# Patient Record
Sex: MALE | Race: BLACK OR AFRICAN AMERICAN | Employment: FULL TIME | URBAN - METROPOLITAN AREA
[De-identification: names, ages, dates, MRNs, and addresses within clinical notes are randomized per-mention and may not be internally consistent; named-entity substitution may affect disease eponyms.]

---

## 2017-03-15 ENCOUNTER — HOSPITAL ENCOUNTER (EMERGENCY)
Facility: HOSPITAL | Age: 42
Discharge: HOME | End: 2017-03-15
Attending: EMERGENCY MEDICINE
Payer: MEDICAID

## 2017-03-15 VITALS
SYSTOLIC BLOOD PRESSURE: 101 MMHG | RESPIRATION RATE: 18 BRPM | TEMPERATURE: 98 F | DIASTOLIC BLOOD PRESSURE: 75 MMHG | HEART RATE: 57 BPM | OXYGEN SATURATION: 96 %

## 2017-03-15 DIAGNOSIS — M54.6 ACUTE BILATERAL THORACIC BACK PAIN: Primary | ICD-10-CM

## 2017-03-15 PROCEDURE — 99283 EMERGENCY DEPT VISIT LOW MDM: CPT

## 2017-03-15 RX ORDER — IBUPROFEN 600 MG/1
600 TABLET ORAL ONCE
Status: COMPLETED | OUTPATIENT
Start: 2017-03-15 | End: 2017-03-15

## 2017-03-15 RX ORDER — DIAZEPAM 2 MG/1
2 TABLET ORAL 3 TIMES DAILY PRN
Qty: 20 TABLET | Refills: 0 | Status: SHIPPED | OUTPATIENT
Start: 2017-03-15 | End: 2017-03-22

## 2017-03-15 RX ORDER — DIAZEPAM 5 MG/1
5 TABLET ORAL ONCE
Status: COMPLETED | OUTPATIENT
Start: 2017-03-15 | End: 2017-03-15

## 2017-03-15 NOTE — ED NOTES
Pt c/o mid back pain that started Saturday and went away, experience same pain last night, took tylenol and pain subsided, this morning pt experience severe pain with pain score of 7/10 and decided to come to ER to be checked, pt denies any injury to the a

## 2017-03-15 NOTE — ED PROVIDER NOTES
Patient presents with:  Back Pain (musculoskeletal)      HPI:     Chilo Doe is a 46year old male presents for a chief complaint of back pain x 5 days  Exacerbating factors include movement  Relieving factors include aspirin  Denies bowel or bladder 5 out of 5 strength, sensation intact throughout lower extremities down to the dorsal surface of the foot  Extremities:Nontender full range of motion in bilateral lower extremities, no edema, 2+ distal pulses brisk cap refill        MDM   Pulse Ox: 96%, No

## 2018-03-08 ENCOUNTER — HOSPITAL ENCOUNTER (EMERGENCY)
Facility: HOSPITAL | Age: 43
Discharge: HOME | End: 2018-03-08
Attending: EMERGENCY MEDICINE
Payer: MEDICAID

## 2018-03-08 ENCOUNTER — APPOINTMENT (OUTPATIENT)
Dept: GENERAL RADIOLOGY | Facility: HOSPITAL | Age: 43
End: 2018-03-08
Attending: EMERGENCY MEDICINE
Payer: MEDICAID

## 2018-03-08 VITALS
OXYGEN SATURATION: 99 % | HEIGHT: 73 IN | SYSTOLIC BLOOD PRESSURE: 118 MMHG | BODY MASS INDEX: 31.54 KG/M2 | DIASTOLIC BLOOD PRESSURE: 83 MMHG | HEART RATE: 66 BPM | RESPIRATION RATE: 12 BRPM | WEIGHT: 238 LBS | TEMPERATURE: 98 F

## 2018-03-08 DIAGNOSIS — M94.0 COSTOCHONDRITIS: Primary | ICD-10-CM

## 2018-03-08 PROCEDURE — 71046 X-RAY EXAM CHEST 2 VIEWS: CPT | Performed by: EMERGENCY MEDICINE

## 2018-03-08 PROCEDURE — 99283 EMERGENCY DEPT VISIT LOW MDM: CPT

## 2018-03-08 RX ORDER — NAPROXEN 500 MG/1
500 TABLET ORAL 2 TIMES DAILY PRN
Qty: 20 TABLET | Refills: 0 | Status: SHIPPED | OUTPATIENT
Start: 2018-03-08 | End: 2018-03-15

## 2018-03-08 NOTE — ED NOTES
Pt c/o noticing a lump this am in his mid chest, under the xiphoid process. Pt states that he \"can feel the lump\" with deep expiration, but denies pain. Pt denies sob/dyspnea, chest pain, abd pain, n/v/d, fevers, and recent weight loss.  Will continue to

## 2018-03-08 NOTE — ED PROVIDER NOTES
Patient Seen in: [facility] AND [facility] Emergency Department    History   Patient presents with:  Lump Mass (integumentary)    Stated Complaint: \"Found a lump under diaphram\"    HPI    Patient complains of lump under base of sternum.   No sig pain feels stra alert and oiented *3, 2-12 intact, no focal deficit noted  SKIN: good skin turgor, no  rashes  PSYCH: calm, cooperative,        ED Course   Labs Reviewed - No data to display    MDM       Cardiac Monitor: Pulse Readings from Last 1 Encounters:  03/08/18 :

## 2018-03-08 NOTE — ED INITIAL ASSESSMENT (HPI)
Found lump to epigastric area. Pt states difficulty breathing on exhalation this AMX1. Lasting about 20 mins. NO injuries or falls to the area per pt.

## 2020-07-15 RX ORDER — METRONIDAZOLE 500 MG/1
500 TABLET ORAL 2 TIMES DAILY
Qty: 14 TABLET | Refills: 0 | Status: SHIPPED | OUTPATIENT
Start: 2020-07-15 | End: 2020-07-22

## 2021-04-29 ENCOUNTER — HOSPITAL ENCOUNTER (EMERGENCY)
Facility: HOSPITAL | Age: 46
Discharge: HOME | End: 2021-04-29
Attending: EMERGENCY MEDICINE
Payer: MEDICAID

## 2021-04-29 VITALS
OXYGEN SATURATION: 100 % | RESPIRATION RATE: 18 BRPM | TEMPERATURE: 98 F | SYSTOLIC BLOOD PRESSURE: 125 MMHG | DIASTOLIC BLOOD PRESSURE: 76 MMHG | HEART RATE: 67 BPM

## 2021-04-29 DIAGNOSIS — R51.9 NONINTRACTABLE HEADACHE, UNSPECIFIED CHRONICITY PATTERN, UNSPECIFIED HEADACHE TYPE: Primary | ICD-10-CM

## 2021-04-29 PROCEDURE — 99281 EMR DPT VST MAYX REQ PHY/QHP: CPT

## 2021-04-29 NOTE — ED INITIAL ASSESSMENT (HPI)
Pt states he was sent home from work with a headache yesterday and was told he would need a note clearing him to return to work.  Denies additional symptoms

## 2021-04-29 NOTE — ED PROVIDER NOTES
Patient Seen in: [facility] Emergency Department      History   No chief complaint on file. Stated Complaint: Work Note     HPI/Subjective:   HPI    Patient presents to the emergency department requesting a return to work note.   He states that Normal rate and regular rhythm. Heart sounds: No murmur heard. Pulmonary:      Effort: Pulmonary effort is normal. No respiratory distress. Breath sounds: Normal breath sounds. Abdominal:      General: There is no distension.       [place] [address] [facility]  [id number] [place] [address] Omar Perrin [phone]    Schedule an appointment as soon as possible for a visit            Medications Prescribed:  There are no discharge medications for this patient.

## 2024-08-17 ENCOUNTER — HOSPITAL ENCOUNTER (EMERGENCY)
Facility: HOSPITAL | Age: 49
Discharge: HOME | End: 2024-08-18
Attending: EMERGENCY MEDICINE
Payer: COMMERCIAL

## 2024-08-17 ENCOUNTER — APPOINTMENT (OUTPATIENT)
Dept: CT IMAGING | Facility: HOSPITAL | Age: 49
End: 2024-08-17
Attending: EMERGENCY MEDICINE
Payer: COMMERCIAL

## 2024-08-17 DIAGNOSIS — K52.9 COLITIS: Primary | ICD-10-CM

## 2024-08-17 LAB
ALBUMIN SERPL-MCNC: 5 G/DL (ref 3.2–4.8)
ALP LIVER SERPL-CCNC: 69 U/L
ALT SERPL-CCNC: 11 U/L
ANION GAP SERPL CALC-SCNC: 7 MMOL/L (ref 0–18)
AST SERPL-CCNC: 17 U/L (ref ?–34)
BASOPHILS # BLD AUTO: 0.05 X10(3) UL (ref 0–0.2)
BASOPHILS NFR BLD AUTO: 0.5 %
BILIRUB DIRECT SERPL-MCNC: 0.5 MG/DL (ref ?–0.3)
BILIRUB SERPL-MCNC: 1.5 MG/DL (ref 0.3–1.2)
BUN BLD-MCNC: 11 MG/DL (ref 9–23)
BUN/CREAT SERPL: 8 (ref 10–20)
CALCIUM BLD-MCNC: 10 MG/DL (ref 8.7–10.4)
CHLORIDE SERPL-SCNC: 101 MMOL/L (ref 98–112)
CO2 SERPL-SCNC: 30 MMOL/L (ref 21–32)
CREAT BLD-MCNC: 1.37 MG/DL
DEPRECATED RDW RBC AUTO: 41.1 FL (ref 35.1–46.3)
EGFRCR SERPLBLD CKD-EPI 2021: 64 ML/MIN/1.73M2 (ref 60–?)
EOSINOPHIL # BLD AUTO: 0.08 X10(3) UL (ref 0–0.7)
EOSINOPHIL NFR BLD AUTO: 0.8 %
ERYTHROCYTE [DISTWIDTH] IN BLOOD BY AUTOMATED COUNT: 11.9 % (ref 11–15)
GLUCOSE BLD-MCNC: 101 MG/DL (ref 70–99)
HCT VFR BLD AUTO: 44.9 %
HGB BLD-MCNC: 16 G/DL
IMM GRANULOCYTES # BLD AUTO: 0.02 X10(3) UL (ref 0–1)
IMM GRANULOCYTES NFR BLD: 0.2 %
LIPASE SERPL-CCNC: 32 U/L (ref 12–53)
LYMPHOCYTES # BLD AUTO: 1.96 X10(3) UL (ref 1–4)
LYMPHOCYTES NFR BLD AUTO: 18.8 %
MCH RBC QN AUTO: 33.2 PG (ref 26–34)
MCHC RBC AUTO-ENTMCNC: 35.6 G/DL (ref 31–37)
MCV RBC AUTO: 93.2 FL
MONOCYTES # BLD AUTO: 0.73 X10(3) UL (ref 0.1–1)
MONOCYTES NFR BLD AUTO: 7 %
NEUTROPHILS # BLD AUTO: 7.57 X10 (3) UL (ref 1.5–7.7)
NEUTROPHILS # BLD AUTO: 7.57 X10(3) UL (ref 1.5–7.7)
NEUTROPHILS NFR BLD AUTO: 72.7 %
OSMOLALITY SERPL CALC.SUM OF ELEC: 286 MOSM/KG (ref 275–295)
PLATELET # BLD AUTO: 284 10(3)UL (ref 150–450)
POTASSIUM SERPL-SCNC: 4 MMOL/L (ref 3.5–5.1)
PROT SERPL-MCNC: 7.9 G/DL (ref 5.7–8.2)
RBC # BLD AUTO: 4.82 X10(6)UL
SODIUM SERPL-SCNC: 138 MMOL/L (ref 136–145)
WBC # BLD AUTO: 10.4 X10(3) UL (ref 4–11)

## 2024-08-17 PROCEDURE — 74177 CT ABD & PELVIS W/CONTRAST: CPT | Performed by: EMERGENCY MEDICINE

## 2024-08-17 PROCEDURE — 99284 EMERGENCY DEPT VISIT MOD MDM: CPT

## 2024-08-17 PROCEDURE — 85025 COMPLETE CBC W/AUTO DIFF WBC: CPT

## 2024-08-17 PROCEDURE — 80076 HEPATIC FUNCTION PANEL: CPT

## 2024-08-17 PROCEDURE — 80076 HEPATIC FUNCTION PANEL: CPT | Performed by: EMERGENCY MEDICINE

## 2024-08-17 PROCEDURE — 80048 BASIC METABOLIC PNL TOTAL CA: CPT

## 2024-08-17 PROCEDURE — 96374 THER/PROPH/DIAG INJ IV PUSH: CPT

## 2024-08-17 PROCEDURE — 85025 COMPLETE CBC W/AUTO DIFF WBC: CPT | Performed by: EMERGENCY MEDICINE

## 2024-08-17 PROCEDURE — 80048 BASIC METABOLIC PNL TOTAL CA: CPT | Performed by: EMERGENCY MEDICINE

## 2024-08-17 PROCEDURE — S0028 INJECTION, FAMOTIDINE, 20 MG: HCPCS | Performed by: EMERGENCY MEDICINE

## 2024-08-17 PROCEDURE — 96361 HYDRATE IV INFUSION ADD-ON: CPT

## 2024-08-17 PROCEDURE — 83690 ASSAY OF LIPASE: CPT | Performed by: EMERGENCY MEDICINE

## 2024-08-17 PROCEDURE — 83690 ASSAY OF LIPASE: CPT

## 2024-08-17 RX ORDER — FAMOTIDINE 10 MG/ML
20 INJECTION, SOLUTION INTRAVENOUS ONCE
Status: COMPLETED | OUTPATIENT
Start: 2024-08-17 | End: 2024-08-17

## 2024-08-18 VITALS
SYSTOLIC BLOOD PRESSURE: 127 MMHG | RESPIRATION RATE: 20 BRPM | WEIGHT: 220 LBS | DIASTOLIC BLOOD PRESSURE: 76 MMHG | BODY MASS INDEX: 29.16 KG/M2 | OXYGEN SATURATION: 99 % | TEMPERATURE: 97 F | HEART RATE: 56 BPM | HEIGHT: 73 IN

## 2024-08-18 RX ORDER — ONDANSETRON 4 MG/1
4 TABLET, ORALLY DISINTEGRATING ORAL EVERY 4 HOURS PRN
Qty: 10 TABLET | Refills: 0 | Status: SHIPPED | OUTPATIENT
Start: 2024-08-18 | End: 2024-08-25

## 2024-08-18 NOTE — ED QUICK NOTES
Written and verbal discharge instructions regarding colitis were discussed with pt including rx meds, f/u w/ pcp and return to er for worsening conditions. Pt verbalized understanding

## 2024-08-18 NOTE — ED PROVIDER NOTES
Patient Seen in: [facility] Emergency Department      History     Chief Complaint   Patient presents with   • Nausea/Vomiting/Diarrhea   • Abdomen/Flank Pain     Stated Complaint: Stomach Pains, Nausea, Vomiting    Subjective:   HPI    Patient is a 49-year-old male who presents with 2 days of generalized constant abdominal pain.  Minimal relief with Pepto-Bismol.  Positive decreased p.o. intake.  Positive vomiting with no diarrhea.  Denies any fevers, sick contacts or recent travel.  Denies any bad food intake.  Denies any dizziness or weakness.  States he feels generally gassy and bloated.    Objective:   History reviewed. No pertinent past medical history.           Past Surgical History:   Procedure Laterality Date   • Augmentation cheek bone     • Closed rx nose fracture  2013                Social History     Socioeconomic History   • Marital status: Single   Tobacco Use   • Smoking status: Some Days   • Smokeless tobacco: Never   Vaping Use   • Vaping status: Never Used   Substance and Sexual Activity   • Alcohol use: Never   • Drug use: Yes     Types: Cannabis     Comment: occ     Social Determinants of Health     Food Insecurity: High Risk (5/31/2024)    Received from [facility]    Food Insecurity    • Have there been times that your food ran out, and you didn't have money to get more?: Yes   Transportation Needs: Low Risk  (5/31/2024)    Received from [facility]    Transportation Needs    • Do you have trouble getting transportation to medical appointments?: No   Housing Stability:   Recent Concern: Housing Stability - High Risk (5/31/2024)    Received from [facility]    Housing Stability    • Are you concerned about having a safe and reliable place to live?: Yes              Review of Systems    Positive for stated Chief Complaint: Nausea/Vomiting/Diarrhea and Abdomen/Flank Pain    Other systems are as noted in HPI.  Constitutional  and vital signs reviewed.      All other systems reviewed and negative except as noted above.    Physical Exam     ED Triage Vitals [08/17/24 2050]   /81   Pulse 55   Resp 20   Temp 97.2 °F (36.2 °C)   Temp src Temporal   SpO2 97 %   O2 Device None (Room air)       Current Vitals:   Vital Signs  BP: 129/73  Pulse: 56  Resp: 20  Temp: 97.2 °F (36.2 °C)  Temp src: Temporal  MAP (mmHg): 91    Oxygen Therapy  SpO2: 97 %  O2 Device: None (Room air)            Physical Exam    GENERAL: No acute distress, awake and alert  HEENT: EOMI, PERRL, MMM  Neck: supple  CV: RRR, no murmurs  Resp: CTAB, no wheezes or retractions  Ab: soft, TTP over epigastrum. No guarding or rebound, no masses. Bs normal   Extremities: FROM of all extremities  Neuro: CN intact, normal speech, normal gait, 5/5 motor strength in all extremities, no focal deficits  SKIN: warm, dry, no rashes      ED Course     Labs Reviewed   BASIC METABOLIC PANEL (8) - Abnormal; Notable for the following components:       Result Value    Glucose 101 (*)     Creatinine 1.37 (*)     BUN/CREA Ratio 8.0 (*)     All other components within normal limits   HEPATIC FUNCTION PANEL (7) - Abnormal; Notable for the following components:    Bilirubin, Total 1.5 (*)     Bilirubin, Direct 0.5 (*)     Albumin 5.0 (*)     All other components within normal limits   LIPASE - Normal   CBC WITH DIFFERENTIAL WITH PLATELET            MDM         Medical Decision Making  Ddx: gastroenteritis, GERD, pancreatitis, biliary colic    Reassessment: pt feeling improved after ivf. Notified of results  Tolerating PO  Labs reassuring    Amount and/or Complexity of Data Reviewed  External Data Reviewed: labs.     Details: Labs 6/2024 reviewed  Labs: ordered.  Radiology: ordered.     Details: CT ABDOMEN AND PELVIS WITH CONTRAST    Comparison: None      IMPRESSION:    Moderate motion artifact at the level of the pelvis, for which repeat imaging was performed.    Mild colonic wall thickening,  involving the transverse colon and descending colon as well as sigmoid and rectum.  This is suspicious for colitis.  This is likely accentuated by the decompressed state of the colon.    No evidence of drainable fluid collection or pneumoperitoneum.    Visualized portions of the appendix are unremarkable.  Upper normal appendiceal caliber, without evidence of periappendiceal inflammatory changes.    Bladder wall thickening.  This is nonspecific.  Correlate for evidence of cystitis.    No evidence of ascites or bowel obstruction.    No evidence of mass or lymphadenopathy.    Gallbladder, common bile duct, and pancreas are unremarkable.    No renal abnormality is identified.    Report sent at 12:25 AM Eastern time.    Max Mir MD      Risk  Prescription drug management.        Disposition and Plan     Clinical Impression:  1. Colitis         Disposition:  Discharge  8/18/2024 12:01 am    Follow-up:  Jefry Vail MD  [address] [place] [id number] [phone]    Follow up            Medications Prescribed:  Current Discharge Medication List        START taking these medications    Details   ondansetron 4 MG Oral Tablet Dispersible Take 1 tablet (4 mg total) by mouth every 4 (four) hours as needed for Nausea.  Qty: 10 tablet, Refills: 0

## 2024-08-18 NOTE — ED INITIAL ASSESSMENT (HPI)
Pt arrives through triage with complaints of nausea/ vomiting that started yesterday. Mild abd pain, states it feels like \"gas\". Denies fevers

## 2024-08-18 NOTE — ED QUICK NOTES
This RN performed a PO challenge. Patient was able to tolerate fluids. No emesis or nausea noted.

## 2024-08-19 LAB
ALBUMIN SERPL-MCNC: 5 G/DL (ref 3.2–4.8)
ALBUMIN/GLOB SERPL: 1.7 {RATIO} (ref 1–2)
ALP LIVER SERPL-CCNC: 67 U/L
ALT SERPL-CCNC: 23 U/L
ANION GAP SERPL CALC-SCNC: 8 MMOL/L (ref 0–18)
AST SERPL-CCNC: 65 U/L (ref ?–34)
BASOPHILS # BLD AUTO: 0.06 X10(3) UL (ref 0–0.2)
BASOPHILS NFR BLD AUTO: 0.5 %
BILIRUB SERPL-MCNC: 1.7 MG/DL (ref 0.3–1.2)
BUN BLD-MCNC: 9 MG/DL (ref 9–23)
BUN/CREAT SERPL: 6.8 (ref 10–20)
CALCIUM BLD-MCNC: 10.2 MG/DL (ref 8.7–10.4)
CHLORIDE SERPL-SCNC: 101 MMOL/L (ref 98–112)
CO2 SERPL-SCNC: 28 MMOL/L (ref 21–32)
CREAT BLD-MCNC: 1.33 MG/DL
DEPRECATED RDW RBC AUTO: 40.8 FL (ref 35.1–46.3)
EGFRCR SERPLBLD CKD-EPI 2021: 66 ML/MIN/1.73M2 (ref 60–?)
EOSINOPHIL # BLD AUTO: 0.1 X10(3) UL (ref 0–0.7)
EOSINOPHIL NFR BLD AUTO: 0.9 %
ERYTHROCYTE [DISTWIDTH] IN BLOOD BY AUTOMATED COUNT: 12.2 % (ref 11–15)
GLOBULIN PLAS-MCNC: 3 G/DL (ref 2–3.5)
GLUCOSE BLD-MCNC: 90 MG/DL (ref 70–99)
HCT VFR BLD AUTO: 44.2 %
HGB BLD-MCNC: 16 G/DL
IMM GRANULOCYTES # BLD AUTO: 0.02 X10(3) UL (ref 0–1)
IMM GRANULOCYTES NFR BLD: 0.2 %
LYMPHOCYTES # BLD AUTO: 2.33 X10(3) UL (ref 1–4)
LYMPHOCYTES NFR BLD AUTO: 21 %
MCH RBC QN AUTO: 33.2 PG (ref 26–34)
MCHC RBC AUTO-ENTMCNC: 36.2 G/DL (ref 31–37)
MCV RBC AUTO: 91.7 FL
MONOCYTES # BLD AUTO: 1.12 X10(3) UL (ref 0.1–1)
MONOCYTES NFR BLD AUTO: 10.1 %
NEUTROPHILS # BLD AUTO: 7.47 X10 (3) UL (ref 1.5–7.7)
NEUTROPHILS # BLD AUTO: 7.47 X10(3) UL (ref 1.5–7.7)
NEUTROPHILS NFR BLD AUTO: 67.3 %
OSMOLALITY SERPL CALC.SUM OF ELEC: 282 MOSM/KG (ref 275–295)
PLATELET # BLD AUTO: 295 10(3)UL (ref 150–450)
POTASSIUM SERPL-SCNC: 4.1 MMOL/L (ref 3.5–5.1)
PROT SERPL-MCNC: 8 G/DL (ref 5.7–8.2)
RBC # BLD AUTO: 4.82 X10(6)UL
SODIUM SERPL-SCNC: 137 MMOL/L (ref 136–145)
WBC # BLD AUTO: 11.1 X10(3) UL (ref 4–11)

## 2024-08-19 PROCEDURE — 85025 COMPLETE CBC W/AUTO DIFF WBC: CPT | Performed by: EMERGENCY MEDICINE

## 2024-08-19 PROCEDURE — 96375 TX/PRO/DX INJ NEW DRUG ADDON: CPT

## 2024-08-19 PROCEDURE — 85025 COMPLETE CBC W/AUTO DIFF WBC: CPT

## 2024-08-19 PROCEDURE — 80053 COMPREHEN METABOLIC PANEL: CPT | Performed by: EMERGENCY MEDICINE

## 2024-08-19 PROCEDURE — 80053 COMPREHEN METABOLIC PANEL: CPT

## 2024-08-19 PROCEDURE — 96361 HYDRATE IV INFUSION ADD-ON: CPT

## 2024-08-19 PROCEDURE — 99284 EMERGENCY DEPT VISIT MOD MDM: CPT

## 2024-08-19 PROCEDURE — 96374 THER/PROPH/DIAG INJ IV PUSH: CPT

## 2024-08-20 ENCOUNTER — HOSPITAL ENCOUNTER (EMERGENCY)
Facility: HOSPITAL | Age: 49
Discharge: HOME | End: 2024-08-20
Attending: EMERGENCY MEDICINE
Payer: COMMERCIAL

## 2024-08-20 VITALS
TEMPERATURE: 97 F | DIASTOLIC BLOOD PRESSURE: 67 MMHG | RESPIRATION RATE: 14 BRPM | HEART RATE: 55 BPM | WEIGHT: 225 LBS | SYSTOLIC BLOOD PRESSURE: 117 MMHG | OXYGEN SATURATION: 100 % | BODY MASS INDEX: 29.82 KG/M2 | HEIGHT: 73 IN

## 2024-08-20 DIAGNOSIS — R11.2 NAUSEA AND VOMITING, UNSPECIFIED VOMITING TYPE: ICD-10-CM

## 2024-08-20 DIAGNOSIS — K52.9 COLITIS: Primary | ICD-10-CM

## 2024-08-20 LAB — SARS-COV-2 RNA RESP QL NAA+PROBE: NOT DETECTED

## 2024-08-20 PROCEDURE — S0028 INJECTION, FAMOTIDINE, 20 MG: HCPCS | Performed by: EMERGENCY MEDICINE

## 2024-08-20 RX ORDER — CIPROFLOXACIN 500 MG/1
500 TABLET, FILM COATED ORAL 2 TIMES DAILY
Qty: 20 TABLET | Refills: 0 | Status: SHIPPED | OUTPATIENT
Start: 2024-08-20 | End: 2024-08-30

## 2024-08-20 RX ORDER — ONDANSETRON 4 MG/1
4 TABLET, ORALLY DISINTEGRATING ORAL EVERY 4 HOURS PRN
Qty: 10 TABLET | Refills: 0 | Status: SHIPPED | OUTPATIENT
Start: 2024-08-20 | End: 2024-08-27

## 2024-08-20 RX ORDER — METRONIDAZOLE 500 MG/1
500 TABLET ORAL 3 TIMES DAILY
Qty: 30 TABLET | Refills: 0 | Status: SHIPPED | OUTPATIENT
Start: 2024-08-20 | End: 2024-08-30

## 2024-08-20 RX ORDER — CIPROFLOXACIN 500 MG/1
500 TABLET, FILM COATED ORAL ONCE
Status: COMPLETED | OUTPATIENT
Start: 2024-08-20 | End: 2024-08-20

## 2024-08-20 RX ORDER — FAMOTIDINE 10 MG/ML
20 INJECTION, SOLUTION INTRAVENOUS ONCE
Status: COMPLETED | OUTPATIENT
Start: 2024-08-20 | End: 2024-08-20

## 2024-08-20 RX ORDER — ONDANSETRON 2 MG/ML
4 INJECTION INTRAMUSCULAR; INTRAVENOUS ONCE
Status: COMPLETED | OUTPATIENT
Start: 2024-08-20 | End: 2024-08-20

## 2024-08-20 RX ORDER — FAMOTIDINE 20 MG/1
20 TABLET, FILM COATED ORAL 2 TIMES DAILY
Qty: 20 TABLET | Refills: 0 | Status: SHIPPED | OUTPATIENT
Start: 2024-08-20

## 2024-08-20 RX ORDER — METRONIDAZOLE 500 MG/1
500 TABLET ORAL ONCE
Status: COMPLETED | OUTPATIENT
Start: 2024-08-20 | End: 2024-08-20

## 2024-08-20 NOTE — ED QUICK NOTES
Rounding Completed    Plan of Care reviewed. Waiting for PO chall>DC.  Elimination needs assessed.  Patient resting in bed, speaking in full sentences. Pt on cardiac monitor for frequent VS assessments, NSR/SB. No new requests at this time.     Bed is locked and in lowest position. Call light within reach.

## 2024-08-20 NOTE — ED INITIAL ASSESSMENT (HPI)
Pt arrives ambulatory to ED for c/o abd pain and nausea that he was seen for a few days ago and is now getting worse today. +NV, denies diarrhea. Aox4, speaking in full sentences.

## 2024-08-20 NOTE — ED PROVIDER NOTES
Patient Seen in: [facility] Emergency Department    History     Chief Complaint   Patient presents with    Abdomen/Flank Pain       HPI    49-year-old female who presents here today with intractable vomiting is been going on for the past 2 to 3 days.  Patient was in our ER 2 days ago with symptoms for started and labs and a CT done.  CT did show colitis the patient was discharged home with Zofran as needed for nausea/vomiting.  Patient states for the past 2 days since she was discharged she has not been able to keep any food down some vomiting up everything he tries to eat or drink.  Denies any diarrhea no chest pain or shortness of breath.    History reviewed. History reviewed. No pertinent past medical history.    History reviewed.   Past Surgical History:   Procedure Laterality Date    Augmentation cheek bone      Closed rx nose fracture  2013         Medications :  (Not in a hospital admission)       No family history on file.    Smoking Status:   Social History     Socioeconomic History    Marital status:    Tobacco Use    Smoking status: Some Days    Smokeless tobacco: Never   Vaping Use    Vaping status: Never Used   Substance and Sexual Activity    Alcohol use: Never    Drug use: Yes     Types: Cannabis     Comment: occ       Constitutional and vital signs reviewed.      Social History and Family History elements reviewed from today, pertinent positives to the presenting problem noted.    Physical Exam     ED Triage Vitals [08/19/24 2100]   /80   Pulse 60   Resp 18   Temp 97.1 °F (36.2 °C)   Temp src Temporal   SpO2 99 %   O2 Device None (Room air)       All measures to prevent infection transmission during my interaction with the patient were taken. Handwashing was performed prior to and after the exam.  Stethoscope and any equipment used during my examination was cleaned with super sani-cloth germicidal wipes following the exam.     Physical Exam  Vitals and nursing note reviewed.    Cardiovascular:      Rate and Rhythm: Normal rate.   Pulmonary:      Effort: Pulmonary effort is normal.   Abdominal:      Palpations: Abdomen is soft.      Tenderness: There is no abdominal tenderness.   Skin:     General: Skin is warm and dry.      Capillary Refill: Capillary refill takes less than 2 seconds.   Neurological:      General: No focal deficit present.      Mental Status: He is alert.         ED Course        Labs Reviewed   COMP METABOLIC PANEL (14) - Abnormal; Notable for the following components:       Result Value    Creatinine 1.33 (*)     BUN/CREA Ratio 6.8 (*)     AST 65 (*)     Bilirubin, Total 1.7 (*)     Albumin 5.0 (*)     All other components within normal limits   CBC WITH DIFFERENTIAL WITH PLATELET - Abnormal; Notable for the following components:    WBC 11.1 (*)     Monocyte Absolute 1.12 (*)     All other components within normal limits   RAPID SARS-COV-2 BY PCR - Normal       As Interpreted by me    Imaging Results Available and Reviewed while in ED: No results found.  ED Medications Administered:   Medications   sodium chloride 0.9 % IV bolus 1,000 mL (0 mL Intravenous Stopped 8/20/24 0211)   ondansetron (Zofran) 4 MG/2ML injection 4 mg (4 mg Intravenous Given 8/20/24 0112)   famotidine (Pepcid) 20 mg/2mL injection 20 mg (20 mg Intravenous Given 8/20/24 0112)   ciprofloxacin (Cipro) tab 500 mg (500 mg Oral Given 8/20/24 0229)   metRONIDAZOLE (Flagyl) tab 500 mg (500 mg Oral Given 8/20/24 0229)         MDM     Vitals:    08/20/24 0215 08/20/24 0230 08/20/24 0245 08/20/24 0300   BP: 137/79 117/78 119/62 117/67   Pulse: 52 70 55 55   Resp: 18 10 13 14   Temp:       TempSrc:       SpO2: 98% 99% 98% 100%   Weight:       Height:         *I personally reviewed and interpreted all ED vitals.    Pulse Ox: 99%, Room air, Normal     Monitor Interpretation:   normal sinus rhythm as interpreted by me.  The cardiac monitor was ordered to monitor cardiac rate and rhythm.    Differential Diagnosis/  Diagnostic Considerations: Colitis, gastroenteritis, gastritis    Complicating Factors: The patient already has does not have a problem list on file. to contribute to the complexity of this ED evaluation.    Medical Decision Making  Amount and/or Complexity of Data Reviewed  External Data Reviewed: notes.     Details: Reviewed ER documentation from visit 2 days ago to compare labs from then to today along with review CT results to make sure we do not have to repeat it.  Labs: ordered. Decision-making details documented in ED Course.  Radiology:  Decision-making details documented in ED Course.  ECG/medicine tests: ordered and independent interpretation performed. Decision-making details documented in ED Course.    Risk  OTC drugs.  Prescription drug management.    Patient feels better after getting meds and tolerating p.o. fluids and antibiotics given orally.  Reviewed labs with patient and family over the bedside no acute labs nothing is changed over the past 2 days.  Since patient is feeling better explained the patient we will send home with Zofran, Pepcid, antibiotics.  Patient follow-up with primary care Vitile 1 to 2 days.  Also give GI referral the understand to call to make an appointment with the gastroenterologist.  Return to the ER if some continue, get worse, unable to follow-up    Condition upon leaving the department: Stable    Disposition and Plan     Clinical Impression:  1. Colitis    2. Nausea and vomiting, unspecified vomiting type        Disposition:  Discharge    Follow-up:  Jefry Vail MD  [address] [id number] [phone]    Follow up      Scooter Mccarthy MD  [address] [address]  [phone]    Follow up        Medications Prescribed:  Current Discharge Medication List        START taking these medications    Details   !! ondansetron 4 MG Oral Tablet Dispersible Take 1 tablet (4 mg total) by mouth every 4 (four) hours as needed for  Nausea.  Qty: 10 tablet, Refills: 0      metRONIDAZOLE 500 MG Oral Tab Take 1 tablet (500 mg total) by mouth 3 (three) times daily for 10 days.  Qty: 30 tablet, Refills: 0      ciprofloxacin 500 MG Oral Tab Take 1 tablet (500 mg total) by mouth 2 (two) times daily for 10 days.  Qty: 20 tablet, Refills: 0      famotidine 20 MG Oral Tab Take 1 tablet (20 mg total) by mouth 2 (two) times daily.  Qty: 20 tablet, Refills: 0       !! - Potential duplicate medications found. Please discuss with provider.

## 2024-08-20 NOTE — ED QUICK NOTES
Pt provided water and oral meds for PO challenge. Pt tolerated well with no repeat episodes of NV. Provider notified.

## 2024-08-20 NOTE — DISCHARGE INSTRUCTIONS
Follow-up with your primary care provider 1 to 2 days.  Given gastroenterology referral call to make a follow-up appointment.  Return to the ER if some continue, get worse, unable to follow-up

## (undated) NOTE — LETTER
March 15, 2017    Patient: William Alarcon   Date of Visit: 3/15/2017       To Whom It May Concern:    William Alarcon was seen and treated in our emergency department on 3/15/2017. He can return to work on 3/16/17.     If you have any questions or conc

## (undated) NOTE — ED AVS SNAPSHOT
[facility] Emergency Department    [address]     [place] [address]    Phone:  [phone]    Fax:  [phone]           Katie Grayson   MRN: [id number]    Department:  [facility] Emergency Department   Date of Visit:  3/15 BACK, RELIEVING TENSION IN YOUR (ENGLISH)    EXERCISES, NECK AND UPPER BACK, REACH AND HOLD (ENGLISH)    EXERCISES, NECK AND UPPER BACK, SHOULDER AND UPPER BACK STRETCH (ENGLISH)    EXERCISES, NECK AND UPPER BACK, SHOULDER SHRUG (ENGLISH)      Disclosure visiting [email or web address].    IF THERE IS ANY CHANGE OR WORSENING OF YOUR CONDITION, CALL YOUR PRIMARY CARE PHYSICIAN AT ONCE OR RETURN IMMEDIATELY TO THE EMERGENCY DEPARTMENT.     If you have been prescribed any medication(s), please fill your prescription - If you have concerns related to behavioral health issues or thoughts of harming yourself, contact [address] [place] [address] at [phone].     - If you don’t have insurance, [facility] has partnered with [facility] [employer] [facility]

## (undated) NOTE — LETTER
Date & Time: 4/29/2021, 10:09 AM  Patient: Laxmi Pratt  Encounter Provider(s):    Sangita Hoffman MD       To Whom It May Concern:    Laxmi Pratt was seen and treated in our department on 4/29/2021. He can return to work.     If you have any qu

## (undated) NOTE — ED AVS SNAPSHOT
[facility] Emergency Department    [address]     [place] [address]    Phone:  [phone]    Fax:  [phone]           Laxmi Pratt   MRN: [id number]    Department:  [facility] Emergency Department   Date of Visit:  3/15 and Class Registration line at [phone] or find a doctor online by visiting [email or web address].    IF THERE IS ANY CHANGE OR WORSENING OF YOUR CONDITION, CALL YOUR PRIMARY CARE PHYSICIAN AT ONCE OR RETURN IMMEDIATELY TO [address] [place] [address].     If

## (undated) NOTE — ED AVS SNAPSHOT
Jayson Hudson   MRN: [id number]    Department:  [facility] Emergency Department   Date of Visit:  3/8/2018           Disclosure     Insurance plans vary and the physician(s) referred by the ER may not be covered by your plan.  Please contact y CARE PHYSICIAN AT ONCE OR RETURN IMMEDIATELY TO THE EMERGENCY DEPARTMENT. If you have been prescribed any medication(s), please fill your prescription right away and begin taking the medication(s) as directed.   If you believe that any of the medications